# Patient Record
(demographics unavailable — no encounter records)

---

## 2025-01-13 NOTE — ASSESSMENT
[FreeTextEntry1] : 50 year  old woman  with sleep apnea is doing well with the CPAP.  Patient is compliant with the CPAP and benefited significantly with the CPAP.

## 2025-01-13 NOTE — HISTORY OF PRESENT ILLNESS
[Date: ___] : Date of most recent diagnostic polysomnogram: [unfilled] [AHI: ___ per hour] : Apnea-hypopnea index:  [unfilled] per hour [T90%: ___] : T90%: [unfilled]% [Morgan desatuation%: ___] : Morgan desaturation:  [unfilled]% [FreeTextEntry1] : Kelli Jain NP (ph [phone]) Dr. Nickolas Rangel 50 year old woman with history of htn, adrenal adenoma is here in the sleep center to address sleep apnea. symptoms prior to cpap-   Patient is sleepy with [place] sleepiness score of 14.  Patient has very loud snoring which disturbs her , does not have any witnessed apneas.  Patient's bedtime is around 10.30 PM wakes up in the morning around 5.30 AM.   She feels tired when she wakes up.  Patient drinks 1-2 cups of decaf coffee during the daytime. Patient does not have any headaches or nocturia. She is sleepy while drivings sometimes. STOPBANG score - 5 neck size - 16 inches  sleep study showed mild sleep apnea.    symptoms on the cpap - Patient is not sleepy with [place] sleepiness score of 4.  Patient does not snore with cpap.  Patient's bedtime is around 10.30 PM wakes up in the morning around 5.30 AM.   She feels rested when she wakes up.  Patient drinks 1-2 cups of decaf coffee during the daytime. Patient does not have any headaches or nocturia. She is not sleepy while driving.  download data on resmed machine ahi 0.7 pressure 4-20 cm usage 5 hrs uses fullface mask  vicki rosas